# Patient Record
Sex: FEMALE | URBAN - METROPOLITAN AREA
[De-identification: names, ages, dates, MRNs, and addresses within clinical notes are randomized per-mention and may not be internally consistent; named-entity substitution may affect disease eponyms.]

---

## 2020-05-07 DIAGNOSIS — R49.0 HOARSE: Primary | ICD-10-CM

## 2020-05-21 ENCOUNTER — TELEPHONE (OUTPATIENT)
Dept: OTOLARYNGOLOGY | Facility: CLINIC | Age: 70
End: 2020-05-21

## 2020-05-21 NOTE — TELEPHONE ENCOUNTER
----- Message from Hermila Ramon sent at 5/21/2020  8:09 AM CDT -----  Contact: Pt  Pt called and said she has an appt today at 1pm and wants to speak to a nurse from your office before her appt     Pt can be reached at [phone]

## 2020-05-26 ENCOUNTER — OFFICE VISIT (OUTPATIENT)
Dept: OTOLARYNGOLOGY | Facility: CLINIC | Age: 70
End: 2020-05-26
Payer: COMMERCIAL

## 2020-05-26 VITALS — HEART RATE: 74 BPM | SYSTOLIC BLOOD PRESSURE: 134 MMHG | WEIGHT: 183.44 LBS | DIASTOLIC BLOOD PRESSURE: 71 MMHG

## 2020-05-26 DIAGNOSIS — R49.8 VOCAL TREMOR: ICD-10-CM

## 2020-05-26 DIAGNOSIS — R49.0 DYSPHONIA: Primary | ICD-10-CM

## 2020-05-26 DIAGNOSIS — J38.5 LARYNGEAL SPASM: ICD-10-CM

## 2020-05-26 DIAGNOSIS — J38.1 POLYPOID CORDITIS: ICD-10-CM

## 2020-05-26 DIAGNOSIS — R25.1 TREMOR: ICD-10-CM

## 2020-05-26 PROCEDURE — 1101F PR PT FALLS ASSESS DOC 0-1 FALLS W/OUT INJ PAST YR: ICD-10-PCS | Mod: CPTII,S$GLB,, | Performed by: OTOLARYNGOLOGY

## 2020-05-26 PROCEDURE — 99203 OFFICE O/P NEW LOW 30 MIN: CPT | Mod: 25,S$GLB,, | Performed by: OTOLARYNGOLOGY

## 2020-05-26 PROCEDURE — 99203 PR OFFICE/OUTPT VISIT, NEW, LEVL III, 30-44 MIN: ICD-10-PCS | Mod: 25,S$GLB,, | Performed by: OTOLARYNGOLOGY

## 2020-05-26 PROCEDURE — 31575 DIAGNOSTIC LARYNGOSCOPY: CPT | Mod: S$GLB,,, | Performed by: OTOLARYNGOLOGY

## 2020-05-26 PROCEDURE — 31575 PR LARYNGOSCOPY, FLEXIBLE; DIAGNOSTIC: ICD-10-PCS | Mod: S$GLB,,, | Performed by: OTOLARYNGOLOGY

## 2020-05-26 PROCEDURE — 1159F MED LIST DOCD IN RCRD: CPT | Mod: S$GLB,,, | Performed by: OTOLARYNGOLOGY

## 2020-05-26 PROCEDURE — 1101F PT FALLS ASSESS-DOCD LE1/YR: CPT | Mod: CPTII,S$GLB,, | Performed by: OTOLARYNGOLOGY

## 2020-05-26 PROCEDURE — 99999 PR PBB SHADOW E&M-EST. PATIENT-LVL III: CPT | Mod: PBBFAC,,, | Performed by: OTOLARYNGOLOGY

## 2020-05-26 PROCEDURE — 1126F PR PAIN SEVERITY QUANTIFIED, NO PAIN PRESENT: ICD-10-PCS | Mod: S$GLB,,, | Performed by: OTOLARYNGOLOGY

## 2020-05-26 PROCEDURE — 1159F PR MEDICATION LIST DOCUMENTED IN MEDICAL RECORD: ICD-10-PCS | Mod: S$GLB,,, | Performed by: OTOLARYNGOLOGY

## 2020-05-26 PROCEDURE — 99999 PR PBB SHADOW E&M-EST. PATIENT-LVL III: ICD-10-PCS | Mod: PBBFAC,,, | Performed by: OTOLARYNGOLOGY

## 2020-05-26 PROCEDURE — 1126F AMNT PAIN NOTED NONE PRSNT: CPT | Mod: S$GLB,,, | Performed by: OTOLARYNGOLOGY

## 2020-05-26 RX ORDER — SPIRONOLACTONE 50 MG/1
TABLET, FILM COATED ORAL
COMMUNITY
Start: 2020-03-18

## 2020-05-26 RX ORDER — MELOXICAM 7.5 MG/1
TABLET ORAL
COMMUNITY
Start: 2020-05-06

## 2020-05-26 RX ORDER — METOPROLOL SUCCINATE 50 MG/1
50 TABLET, EXTENDED RELEASE ORAL
COMMUNITY
Start: 2020-04-10

## 2020-05-26 RX ORDER — CYCLOBENZAPRINE HCL 10 MG
TABLET ORAL
COMMUNITY
Start: 2020-05-06

## 2020-05-26 RX ORDER — HYDRALAZINE HYDROCHLORIDE 25 MG/1
25 TABLET, FILM COATED ORAL
COMMUNITY
Start: 2020-03-31

## 2020-05-26 RX ORDER — AMLODIPINE BESYLATE 5 MG/1
5 TABLET ORAL
COMMUNITY
Start: 2020-04-10

## 2020-05-26 RX ORDER — POTASSIUM CHLORIDE 20 MEQ/1
20 TABLET, EXTENDED RELEASE ORAL
COMMUNITY
Start: 2020-03-17

## 2020-05-26 RX ORDER — HYDROCODONE BITARTRATE AND ACETAMINOPHEN 10; 325 MG/1; MG/1
TABLET ORAL
COMMUNITY

## 2020-05-26 RX ORDER — RIVAROXABAN 10 MG/1
TABLET, FILM COATED ORAL
COMMUNITY
Start: 2020-04-21

## 2020-05-26 RX ORDER — OMEPRAZOLE 40 MG/1
40 CAPSULE, DELAYED RELEASE ORAL
COMMUNITY
Start: 2020-03-03 | End: 2020-06-01

## 2020-05-26 RX ORDER — ASPIRIN 81 MG/1
81 TABLET ORAL
COMMUNITY
Start: 2018-02-02

## 2020-05-26 RX ORDER — ROSUVASTATIN CALCIUM 20 MG/1
40 TABLET, COATED ORAL
COMMUNITY
Start: 2020-01-23

## 2020-05-26 RX ORDER — ZOLPIDEM TARTRATE 5 MG/1
TABLET ORAL
COMMUNITY
Start: 2020-04-10

## 2020-05-26 NOTE — PROGRESS NOTES
[facility]  Department of Otorhinolaryngology and Communication Sciences    Maribel Smith is a 69 y.o. female who presents to the [facility] for consultation at the kind request of Dr. Jaswinder Wolfe for further management of hoarseness.     She complains of an irregular voice and vocal fatigue. Onset was gradual. Duration is about 5 years. Time course is intermittent. Symptoms are stable. Exacerbating factors include infections. She denies any alleviating factors. She denies any associated symptoms.  She reports she has always had a low pitched voice.  She used to smoke about a pack a day for a few decades.  She has now taper to approximately 3-4 cigarettes per day.    She denies any personal or family history of neurologic disorders.  She denies involuntary movements.  She is chronically on metoprolol, with the dose which has recently increased.    Past Medical History  HTN  Reflux  Hyperlipidemia  CAD  AFib    Past Surgical History  Cardiac stent     Family History  Colon cancer    Social History  She reports that she has been smoking. She does not have any smokeless tobacco history on file.    Allergies  She is allergic to doxycycline.    Medications  She has a current medication list which includes the following prescription(s): amlodipine, aspirin, hydralazine, metoprolol succinate, omeprazole, potassium chloride sa, rosuvastatin, cyclobenzaprine, hydrocodone-acetaminophen, meloxicam, spironolactone, xarelto, and zolpidem.    Review of Systems   Constitutional: Negative for fever.   HENT: Negative for sore throat.    Eyes: Negative for visual disturbance.   Respiratory: Negative for wheezing.    Cardiovascular: Negative for chest pain.   Gastrointestinal: Negative for nausea.   Musculoskeletal: Negative for arthralgias.   Skin: Negative for rash.   Neurological: Negative for tremors.   Hematological: Does not bruise/bleed easily.   Psychiatric/Behavioral: The patient is not nervous/anxious.            Objective:     /71 (Patient Position: Sitting)   Pulse 74   Wt 83.2 kg (183 lb 6.8 oz)      Physical Exam    Constitutional: comfortable, well dressed  Psychiatric: appropriate affect  Respiratory: comfortably breathing, symmetric chest rise, no stridor  Voice: low pitch; low frequency, moderate amplitude tremor most evident on sustained vowel phonation  Cardiovascular: upper extremities non-edematous  Lymphatic: no cervical lymphadenopathy  Neurologic: alert and oriented to time, place, person, and situation; cranial nerves 3-12 grossly intact  Head: normocephalic  Eyes: conjunctivae and sclerae clear  Ears: normal pinnae, normal external auditory canals, tympanic membranes intact  Nose: mucosa pink and noncongested, no masses, no mucopurulence, no polyps  Oral cavity / oropharynx: no mucosal lesions  Neck: soft, full range of motion, laryngotracheal complex palpable with appropriate landmarks, larynx elevates on swallowing  Indirect laryngoscopy: limited due to gag    Procedure  Flexible Laryngoscopy (57847): Laryngoscopy is indicated for assessment of upper aerodigestive structure and function. This was carried out transnasally with a [employer] chip videoendoscope. After verbal consent was obtained, the patient was positioned and the nose was topically decongested with 1% phenylephrine and topically anesthetized with 4% lidocaine. The endoscope was passed through the most patent nasal cavity and positioned to image the nasopharynx, larynx, and hypopharynx in detail. The following features were examined: nasopharyngeal, laryngeal, hypopharyngeal masses; velopharyngeal strength, closure, and symmetry of motion; vocal fold range and symmetry of motion; laryngeal mucosal edema, erythema, inflammation, and hydration; salivary pooling; and gross laryngeal sensation. The equipment was removed. The patient tolerated the procedure well without complication. All findings were normal except:  - bilateral polypoid  corditis, left greater than right  - low frequency, moderate amplitude tremor noted on sustained vowel phonation, with both intrinsic laryngeal component and pharyngeal/jaw involvement      Assessment:     Maribel Smith is a 69 y.o. female with smoking-related polypoid corditis and vocal tremor.     Plan:        I had a discussion with the patient regarding her condition and the further workup and management options.      The patient is reassured that these symptoms do not appear to represent a serious or threatening condition. Nevertheless, smoking cessation was encouraged.  While micro laryngeal excision of her polypoid corditis could be performed, I would not expect this to provide relief in her symptoms, which are primarily due to her vocal tremor.  I spoke with the patient regarding the management of this latter condition.  I recommended comprehensive neurologic evaluation and or decision on candidiacy for pharmacologic management.  I also discussed with her laryngeal botulinum toxin injection, which I can offer her here.  She declines in this intervention at present.  She will contact us in the future if we may be of any assistance.    She will follow up with me in the future on an as-needed basis.    All questions were answered, and the patient is in agreement with the above.     Savage North M.D.  [facility]  Department of Otorhinolaryngology and Communication Sciences

## 2020-05-26 NOTE — PATIENT INSTRUCTIONS
Please see a neurologist regarding tremor  Conisder Botox injections for treatment of the voice tremor        What to expect:  Botox for tremor    The purpose of the Botox injection into the larynx is to cause a temporary weakening of the vocal fold muscles in order to reduce the tremor in your voice.  It takes several days to begin working, but after the first few days you should notice an improvement in your voice.  Every injection may be slightly different, so it is important for you to keep track of your voice and swallowing status on the log sheet provided.  Please return the log each time you come in for an injection.      Basic Botox guidelines  1. Within 24-72 hours, you should expect your voice to become weak and breathy.  After one week, call into the [facility] Center at [phone] and leave a message so our team can hear your voice and  the effectiveness of the injection.   We will not call you back unless you ask us to do so.      2. After 1-2 weeks, your voice should start to get stronger and have fewer spasms.  The stronger voice typically lasts for about three months, but there is some variability.    3. You may experience some difficulties with swallowing for the first week after your injection, but these should be short-lived.  Take extra care when drinking thin liquids such as water, coffee, juice, soda, and tea.  If you do find yourself coughing frequently with liquids, try tucking your chin while you swallow, or thickening your liquids.  Thick-It, a liquid thickener, can be purchased at most pharmacies.      4. Helpful resources:  a. http://www.dysphonia.org   b. http://www.clarisa.org  c. http://www.entlink.net    If you have any questions, please call our office at [phone], or contact us through the [facility] portal.        OnabotulinumtoxinA injection (Medical Use)  What is this medicine?  ONABOTULINUMTOXINA (o na HAMLET you lye num tox in ) is a neuro-muscular blocker. This medicine  is used to treat crossed eyes, eyelid spasms, severe neck muscle spasms, ankle and toe muscle spasms, and elbow, wrist, and finger muscle spasms. It is also used to treat excessive underarm sweating, to prevent chronic migraine headaches, and to treat loss of bladder control due to neurologic conditions such as multiple sclerosis or spinal cord injury.  How should I use this medicine?  This medicine is for injection into a muscle. It is given by a health care professional in a hospital or clinic setting.  Talk to your pediatrician regarding the use of this medicine in children. While this drug may be prescribed for children as young as 12 years old for selected conditions, precautions do apply.  What side effects may I notice from receiving this medicine?  Side effects that you should report to your doctor or health care professional as soon as possible:  · allergic reactions like skin rash, itching or hives, swelling of the face, lips, or tongue  · breathing problems  · changes in vision  · chest pain or tightness  · eye irritation, pain  · fast, irregular heartbeat  · infection  · numbness  · speech problems  · swallowing problems  · unusual weakness  Side effects that usually do not require medical attention (report to your doctor or health care professional if they continue or are bothersome):  · bruising or pain at site where injected  · drooping eyelid  · dry eyes or mouth  · headache  · muscles aches, pains  · sensitivity to light  · tearing  What may interact with this medicine?  · aminoglycoside antibiotics like gentamicin, neomycin, tobramycin  · muscle relaxants  · other botulinum toxin injections  What if I miss a dose?  This does not apply.  Where should I keep my medicine?  This drug is given in a hospital or clinic and will not be stored at home.  What should I tell my health care provider before I take this medicine?  They need to know if you have any of these conditions:  · breathing  problems  · cerebral palsy spasms  · difficulty urinating  · heart problems  · history of surgery where this medicine is going to be used  · infection at the site where this medicine is going to be used  · myasthenia gravis or other neurologic disease  · nerve or muscle disease  · surgery plans  · take medicines that treat or prevent blood clots  · thyroid problems  · an unusual or allergic reaction to botulinum toxin, albumin, other medicines, foods, dyes, or preservatives  · pregnant or trying to get pregnant  · breast-feeding  What should I watch for while using this medicine?  Visit your doctor for regular check ups.  This medicine will cause weakness in the muscle where it is injected. Tell your doctor if you feel unusually weak in other muscles. Get medical help right away if you have problems with breathing, swallowing, or talking.  This medicine might make your eyelids droop or make you see blurry or double. If you have weak muscles or trouble seeing do not drive a car, use machinery, or do other dangerous activities.  This medicine contains albumin from human blood. It may be possible to pass an infection in this medicine, but no cases have been reported. Talk to your doctor about the risks and benefits of this medicine.  If your activities have been limited by your condition, go back to your regular routine slowly after treatment with this medicine.  NOTE:This sheet is a summary. It may not cover all possible information. If you have questions about this medicine, talk to your doctor, pharmacist, or health care provider. Copyright© 2017 [employer]          DIANAS EDEMA/POLYPOID DEGENERATION     What is Dianas edema?   Dianas edema (sometimes called polypoid degeneration) involves irregular swelling and ballooning along the whole length of both vocal folds--this is unlike a polyp, which is a distinct lesion on one or both vocal folds. A common symptom is a very low speaking pitch, particularly in  women.   Dianas edema is often seen in persons who have used their voices heavily, or have been exposed to irritants such as smoke, chemicals, or reflux. Heavy alcohol use also increases the likelihood of this condition.     How is it treated?   Treatment is dependent upon the severity of the lesions. If the swelling is obstructing the airway, surgery is the first line of treatment. If the swelling is mild and not obstructing the airway, smoking cessation along with voice therapy is a more common first approach. Therapy can help with swelling and symptom reduction, but Dianas edema is also known to recur, particularly in persons who continue to smoke.

## 2020-05-26 NOTE — LETTER
May 26, 2020      Jaswinder Wolfe MD  [address] [facility] [address]           [facility]  [address]  [facility] [address]  Phone: [phone]  Fax: [phone]          Patient: Maribel Smith   MR Number: [id number]   YOB: 1950   Date of Visit: 5/26/2020       Dear Dr. Jaswinder Wolfe:    Thank you for referring Maribel Smith to me for evaluation. Attached you will find relevant portions of my assessment and plan of care.    If you have questions, please do not hesitate to call me. I look forward to following Maribel Smith along with you.    Sincerely,    Savage North MD    Enclosure  CC:  No Recipients    If you would like to receive this communication electronically, please contact [email or web address] or [phone] to request more information on [employer] access.    For providers and/or their staff who would like to refer a patient to [facility], please contact us through our one-stop-shop provider referral line, [facility], at [phone].    If you feel you have received this communication in error or would no longer like to receive these types of communications, please e-mail [email or web address]

## 2025-05-08 ENCOUNTER — OFFICE VISIT (OUTPATIENT)
Dept: OTOLARYNGOLOGY | Facility: CLINIC | Age: 75
End: 2025-05-08
Payer: MEDICARE

## 2025-05-08 VITALS — SYSTOLIC BLOOD PRESSURE: 172 MMHG | HEART RATE: 69 BPM | DIASTOLIC BLOOD PRESSURE: 92 MMHG

## 2025-05-08 DIAGNOSIS — J38.5 LARYNGEAL SPASM: ICD-10-CM

## 2025-05-08 DIAGNOSIS — R49.8 VOCAL TREMOR: ICD-10-CM

## 2025-05-08 DIAGNOSIS — R49.0 DYSPHONIA: Primary | ICD-10-CM

## 2025-05-08 DIAGNOSIS — J38.1 POLYPOID CORDITIS: ICD-10-CM

## 2025-05-08 PROCEDURE — 99999 PR PBB SHADOW E&M-NEW PATIENT-LVL III: CPT | Mod: PBBFAC,,, | Performed by: OTOLARYNGOLOGY

## 2025-05-08 PROCEDURE — 1160F RVW MEDS BY RX/DR IN RCRD: CPT | Mod: CPTII,S$GLB,, | Performed by: OTOLARYNGOLOGY

## 2025-05-08 PROCEDURE — 31579 LARYNGOSCOPY TELESCOPIC: CPT | Mod: S$GLB,,, | Performed by: OTOLARYNGOLOGY

## 2025-05-08 PROCEDURE — 99204 OFFICE O/P NEW MOD 45 MIN: CPT | Mod: 25,S$GLB,, | Performed by: OTOLARYNGOLOGY

## 2025-05-08 PROCEDURE — 4010F ACE/ARB THERAPY RXD/TAKEN: CPT | Mod: CPTII,S$GLB,, | Performed by: OTOLARYNGOLOGY

## 2025-05-08 PROCEDURE — 1126F AMNT PAIN NOTED NONE PRSNT: CPT | Mod: CPTII,S$GLB,, | Performed by: OTOLARYNGOLOGY

## 2025-05-08 PROCEDURE — 3080F DIAST BP >= 90 MM HG: CPT | Mod: CPTII,S$GLB,, | Performed by: OTOLARYNGOLOGY

## 2025-05-08 PROCEDURE — 1159F MED LIST DOCD IN RCRD: CPT | Mod: CPTII,S$GLB,, | Performed by: OTOLARYNGOLOGY

## 2025-05-08 PROCEDURE — 3077F SYST BP >= 140 MM HG: CPT | Mod: CPTII,S$GLB,, | Performed by: OTOLARYNGOLOGY

## 2025-05-08 RX ORDER — FERROUS SULFATE 325(65) MG
325 TABLET ORAL DAILY
COMMUNITY
Start: 2024-09-13

## 2025-05-08 RX ORDER — METOPROLOL TARTRATE 25 MG/1
25 TABLET, FILM COATED ORAL 2 TIMES DAILY
COMMUNITY

## 2025-05-08 RX ORDER — CLOBETASOL PROPIONATE 0.5 MG/G
60 OINTMENT TOPICAL 2 TIMES DAILY
COMMUNITY
Start: 2024-12-03

## 2025-05-08 RX ORDER — CLOPIDOGREL BISULFATE 75 MG/1
75 TABLET ORAL ONCE
COMMUNITY
Start: 2024-12-23

## 2025-05-08 RX ORDER — AZELASTINE 1 MG/ML
2 SPRAY, METERED NASAL
COMMUNITY
Start: 2025-02-10

## 2025-05-08 RX ORDER — AMIODARONE HYDROCHLORIDE 200 MG/1
200 TABLET ORAL DAILY
COMMUNITY
Start: 2025-02-06

## 2025-05-08 RX ORDER — FUROSEMIDE 40 MG/1
40 TABLET ORAL 2 TIMES DAILY
COMMUNITY
Start: 2025-03-13

## 2025-05-08 RX ORDER — ACETAMINOPHEN 500 MG
5000 TABLET ORAL DAILY
COMMUNITY
Start: 2024-10-16

## 2025-05-08 NOTE — PROGRESS NOTES
[facility]  Department of Otorhinolaryngology and Communication Sciences    Maribel Smith is a 74 y.o. female who presents to the [facility] on referral from Dr Olivares for further evaluation of diana's edema and vocal tremor.     I met her 5 years ago regarding Diana's edema and vocal tremor.  I offered her laryngeal botulinum toxin injection, yet she deferred.  She returns today for reassessment.  She reports ongoing difficulty with her voice, at times resulting in difficulty communicating with others.  Voice problem negatively impact her quality of life.  She has seen Neurology.  Parkinsonism has been ruled out, yet no treatment has been initiated for vocal tremor.  She continues to smoke about half a pack a day.    Past Medical History  HTN  Reflux  Hyperlipidemia  CAD  AFib    Past Surgical History  Cardiac stent  Pacemaker    Family History  Her family history is not on file.    Social History  She reports that she has been smoking. She does not have any smokeless tobacco history on file.    Allergies  She is allergic to cefdinir, sulfa (sulfonamide antibiotics), and doxycycline.    Medications  She has a current medication list which includes the following prescription(s): amiodarone, azelastine, cholecalciferol (vitamin d3), clobetasol 0.05%, clopidogrel, ferrous sulfate, furosemide, hydrocodone-acetaminophen, metoprolol tartrate, potassium chloride sa, rosuvastatin, xarelto, zolpidem, amlodipine, aspirin, cyclobenzaprine, hydralazine, meloxicam, omeprazole, and spironolactone.    Review of Systems   Constitutional:  Negative for fever.   HENT:  Negative for sore throat.    Eyes:  Negative for visual disturbance.   Respiratory:  Negative for wheezing.    Cardiovascular:  Negative for chest pain.   Gastrointestinal:  Negative for nausea.   Musculoskeletal:  Negative for arthralgias.   Skin:  Negative for rash.   Neurological:  Negative for tremors.   Hematological:  Does not bruise/bleed easily.    Psychiatric/Behavioral:  The patient is not nervous/anxious.           Objective:     VS reviewed  Physical Exam  Constitutional: comfortable, well dressed  Psychiatric: appropriate affect  Respiratory: comfortably breathing, symmetric chest rise, no stridor  Voice: low pitch; low frequency, moderate amplitude tremor most evident on sustained vowel phonation  Cardiovascular: upper extremities non-edematous  Lymphatic: no cervical lymphadenopathy  Neurologic: alert and oriented to time, place, person, and situation; cranial nerves 3-12 grossly intact  Head: normocephalic  Eyes: conjunctivae and sclerae clear  Ears: normal pinnae, normal external auditory canals, tympanic membranes intact  Nose: mucosa pink and noncongested, no masses, no mucopurulence, no polyps  Oral cavity / oropharynx: no mucosal lesions  Neck: soft, full range of motion, laryngotracheal complex palpable with appropriate landmarks, larynx elevates on swallowing  Indirect laryngoscopy: limited due to gag    Procedure  Flexible Laryngeal Videostroboscopy (81093): Laryngeal videostroboscopy is indicated to assess laryngeal vibratory biomechanics and vocal fold oscillation, which cannot be assessed with a plain light examination. This was carried out transnasally with a distal chip videoendoscope. After verbal consent was obtained, the patient was positioned and the nose was topically decongested with 1% phenylephrine and topically anesthetized with 4% lidocaine. The endoscope was passed through the most patent nasal cavity and positioned to image the nasopharynx, larynx, and hypopharynx in detail. The following features were examined: nasopharyngeal, laryngeal, hypopharyngeal masses; velopharyngeal strength, closure, and symmetry of motion; vocal fold range and symmetry of motion; laryngeal mucosal edema, erythema, inflammation, and hydration; salivary pooling; and gross laryngeal sensation. During phonation, the vocal folds were assessed for  glottal closure; mucosal wave; vocal fold lesions; vibratory periodicity, amplitude, and phase symmetry; and vertical height match. The equipment was removed. The patient tolerated the procedure well without complication. All findings were normal except:  - bilateral polypoid corditis, right greater than left, grade 1  - low frequency, moderate amplitude tremor noted on sustained vowel phonation, with both intrinsic laryngeal component and pharyngeal/jaw involvement        Assessment:     Maribel Smith is a 74 y.o. female with smoking related polypoid corditis and vocal tremor.       Plan:        I had a discussion with the patient and her  regarding her condition and the further workup and management options.      As previously, the patient is reassured that these symptoms do not appear to represent a serious or threatening condition. Nevertheless, smoking cessation was encouraged.  While micro laryngeal excision of her polypoid corditis could be performed, I would not expect this to provide relief in her symptoms, which are primarily due to her vocal tremor.  I again spoke with the patient regarding the management of this latter condition.  She might benefit from a trial of pharmacologic management under the direction of her neurologist.  I also discussed with her treatment with laryngeal botulinum toxin injection, including the risks and benefits thereof.  Nevertheless, I counseled her that I moving out of date in a few weeks.  As such, I would benefit from meeting with laryngologist Dr. Zuly Taylor once she has come on board in that apartment later this summer.    All questions were answered, and the patient is in agreement with the above.     Savage North M.D.  [facility]  Department of Otorhinolaryngology and Communication Sciences